# Patient Record
Sex: MALE | Race: BLACK OR AFRICAN AMERICAN | URBAN - METROPOLITAN AREA
[De-identification: names, ages, dates, MRNs, and addresses within clinical notes are randomized per-mention and may not be internally consistent; named-entity substitution may affect disease eponyms.]

---

## 2022-04-27 ENCOUNTER — HISTORICAL (OUTPATIENT)
Dept: ADMINISTRATIVE | Facility: HOSPITAL | Age: 52
End: 2022-04-27
Payer: MEDICARE

## 2022-05-05 ENCOUNTER — HOSPITAL ENCOUNTER (EMERGENCY)
Facility: HOSPITAL | Age: 52
Discharge: HOME | End: 2022-05-06
Attending: STUDENT IN AN ORGANIZED HEALTH CARE EDUCATION/TRAINING PROGRAM
Payer: MEDICARE

## 2022-05-05 DIAGNOSIS — R41.82 ALTERED MENTAL STATUS: ICD-10-CM

## 2022-05-05 DIAGNOSIS — R41.82 ALTERED MENTAL STATUS, UNSPECIFIED ALTERED MENTAL STATUS TYPE: Primary | ICD-10-CM

## 2022-05-05 LAB
ALBUMIN SERPL-MCNC: 2.9 GM/DL (ref 3.5–5)
ALBUMIN/GLOB SERPL: 0.7 RATIO (ref 1.1–2)
ALP SERPL-CCNC: 37 UNIT/L (ref 40–150)
ALT SERPL-CCNC: 7 UNIT/L (ref 0–55)
APPEARANCE UR: CLEAR
AST SERPL-CCNC: 13 UNIT/L (ref 5–34)
BACTERIA #/AREA URNS AUTO: ABNORMAL /HPF
BASOPHILS # BLD AUTO: 0.02 X10(3)/MCL (ref 0–0.2)
BASOPHILS NFR BLD AUTO: 0.2 %
BILIRUB UR QL STRIP.AUTO: NEGATIVE MG/DL
BILIRUBIN DIRECT+TOT PNL SERPL-MCNC: 0.1 MG/DL (ref 0–0.5)
BILIRUBIN DIRECT+TOT PNL SERPL-MCNC: 0.2 MG/DL (ref 0–0.8)
BILIRUBIN DIRECT+TOT PNL SERPL-MCNC: 0.3 MG/DL
BUN SERPL-MCNC: 18.9 MG/DL (ref 8.4–25.7)
CALCIUM SERPL-MCNC: 8.5 MG/DL (ref 8.4–10.2)
CHLORIDE SERPL-SCNC: 111 MMOL/L (ref 98–107)
CO2 SERPL-SCNC: 23 MMOL/L (ref 22–29)
COLOR UR AUTO: YELLOW
CREAT SERPL-MCNC: 1.12 MG/DL (ref 0.73–1.18)
EOSINOPHIL # BLD AUTO: 0.01 X10(3)/MCL (ref 0–0.9)
EOSINOPHIL NFR BLD AUTO: 0.1 %
ERYTHROCYTE [DISTWIDTH] IN BLOOD BY AUTOMATED COUNT: 15.5 % (ref 11.5–17)
GLOBULIN SER-MCNC: 3.9 GM/DL (ref 2.4–3.5)
GLUCOSE SERPL-MCNC: 87 MG/DL (ref 74–100)
GLUCOSE UR QL STRIP.AUTO: NEGATIVE MG/DL
HCT VFR BLD AUTO: 35.9 % (ref 42–52)
HGB BLD-MCNC: 11.9 GM/DL (ref 14–18)
IMM GRANULOCYTES # BLD AUTO: 0.03 X10(3)/MCL (ref 0–0.02)
IMM GRANULOCYTES NFR BLD AUTO: 0.4 % (ref 0–0.43)
KETONES UR QL STRIP.AUTO: ABNORMAL MG/DL
LACTATE SERPL-SCNC: 2.2 MMOL/L (ref 0.5–2.2)
LEUKOCYTE ESTERASE UR QL STRIP.AUTO: NEGATIVE UNIT/L
LYMPHOCYTES # BLD AUTO: 1.04 X10(3)/MCL (ref 0.6–4.6)
LYMPHOCYTES NFR BLD AUTO: 12.5 %
MCH RBC QN AUTO: 29.2 PG (ref 27–31)
MCHC RBC AUTO-ENTMCNC: 33.1 MG/DL (ref 33–36)
MCV RBC AUTO: 88 FL (ref 80–94)
MONOCYTES # BLD AUTO: 0.87 X10(3)/MCL (ref 0.1–1.3)
MONOCYTES NFR BLD AUTO: 10.5 %
NEUTROPHILS # BLD AUTO: 6.3 X10(3)/MCL (ref 2.1–9.2)
NEUTROPHILS NFR BLD AUTO: 76.3 %
NITRITE UR QL STRIP.AUTO: NEGATIVE
NRBC BLD AUTO-RTO: 0 %
PH UR STRIP.AUTO: 6.5 [PH]
PLATELET # BLD AUTO: 288 X10(3)/MCL (ref 130–400)
PMV BLD AUTO: 10.5 FL (ref 9.4–12.4)
POTASSIUM SERPL-SCNC: 4.6 MMOL/L (ref 3.5–5.1)
PROT SERPL-MCNC: 6.8 GM/DL (ref 6.4–8.3)
PROT UR QL STRIP.AUTO: NEGATIVE MG/DL
RBC # BLD AUTO: 4.08 X10(6)/MCL (ref 4.7–6.1)
RBC #/AREA URNS AUTO: ABNORMAL /HPF
RBC UR QL AUTO: NEGATIVE UNIT/L
SODIUM SERPL-SCNC: 142 MMOL/L (ref 136–145)
SP GR UR STRIP.AUTO: 1.02 (ref 1–1.03)
SQUAMOUS #/AREA URNS AUTO: ABNORMAL /LPF
UROBILINOGEN UR STRIP-ACNC: 1 MG/DL
WBC # SPEC AUTO: 8.3 X10(3)/MCL (ref 4.5–11.5)
WBC #/AREA URNS AUTO: ABNORMAL /HPF

## 2022-05-05 PROCEDURE — 93005 ELECTROCARDIOGRAM TRACING: CPT

## 2022-05-05 PROCEDURE — 81003 URINALYSIS AUTO W/O SCOPE: CPT | Performed by: STUDENT IN AN ORGANIZED HEALTH CARE EDUCATION/TRAINING PROGRAM

## 2022-05-05 PROCEDURE — 80053 COMPREHEN METABOLIC PANEL: CPT | Performed by: STUDENT IN AN ORGANIZED HEALTH CARE EDUCATION/TRAINING PROGRAM

## 2022-05-05 PROCEDURE — 93010 ELECTROCARDIOGRAM REPORT: CPT | Mod: ,,, | Performed by: INTERNAL MEDICINE

## 2022-05-05 PROCEDURE — 83605 ASSAY OF LACTIC ACID: CPT | Performed by: STUDENT IN AN ORGANIZED HEALTH CARE EDUCATION/TRAINING PROGRAM

## 2022-05-05 PROCEDURE — 85025 COMPLETE CBC W/AUTO DIFF WBC: CPT | Performed by: STUDENT IN AN ORGANIZED HEALTH CARE EDUCATION/TRAINING PROGRAM

## 2022-05-05 PROCEDURE — 99285 EMERGENCY DEPT VISIT HI MDM: CPT | Mod: 25

## 2022-05-05 PROCEDURE — 36415 COLL VENOUS BLD VENIPUNCTURE: CPT | Performed by: STUDENT IN AN ORGANIZED HEALTH CARE EDUCATION/TRAINING PROGRAM

## 2022-05-05 PROCEDURE — 81015 MICROSCOPIC EXAM OF URINE: CPT | Performed by: STUDENT IN AN ORGANIZED HEALTH CARE EDUCATION/TRAINING PROGRAM

## 2022-05-05 PROCEDURE — 93010 EKG 12-LEAD: ICD-10-PCS | Mod: ,,, | Performed by: INTERNAL MEDICINE

## 2022-05-06 VITALS
OXYGEN SATURATION: 100 % | BODY MASS INDEX: 33.41 KG/M2 | WEIGHT: 220.44 LBS | TEMPERATURE: 99 F | SYSTOLIC BLOOD PRESSURE: 118 MMHG | DIASTOLIC BLOOD PRESSURE: 68 MMHG | RESPIRATION RATE: 19 BRPM | HEIGHT: 68 IN | HEART RATE: 77 BPM

## 2022-05-06 NOTE — ED NOTES
ATTEMPTED TO CALL [phone] [facility], SINCE INFORMATION WAS NEVER SENT. NO ANSWER WILL ATTEMPT TO CALL AGAIN

## 2022-05-06 NOTE — ED NOTES
[facility] to retrieve patient from hospital. Shashi at group home notified to be expecting pt. No questions.

## 2022-05-06 NOTE — ED NOTES
SPOKE WITH CHARGE RN, HE STATED HE WANTED TO KEEP THE PATIENT UNTIL THE AM AND CALL FOR A MEDICAID RIDE TO ENSURE SOMEONE OPENS THE DOOR AT THE GROUP HOME, SINCE THERE WAS NO ANSWER WHEN CALLED. LUNCH BOX PROVIDED COMFORT MEASURES IN PLACE SAFETY MEAUSRES IN PLACE PT IN AGREEMENT

## 2022-05-06 NOTE — ED NOTES
Spoke with Shashi at group [place]; reports he will be there all day and okay for patient to be transported back to facility. No questions. Will arrange transport.

## 2022-05-06 NOTE — ED NOTES
SPOKE WITH DALE FROM [facility], WAS ABLE TO ACQUIRE AN UPDATED LOCATION TO SENT PATIENT BACK TO GROUP [facility], SET UP RIDE WITH [facility], HE STATED IT WOULD TAKE ETA 1 HOUR

## 2022-05-06 NOTE — ED PROVIDER NOTES
Encounter Date: 5/5/2022    SCRIBE #1 NOTE: I, Romel Franklin, am scribing for, and in the presence of,  Dr. Hawkins. I have scribed the following portions of the note - Other sections scribed: HPI, ROS, Physical Exam, MDM, Attending.       History     Chief Complaint   Patient presents with    Altered Mental Status     From group home w/ AMS and hypotension, confusion per aasi but improved w/ 500ml ns en route. Pt c/o generalized pain and weakness, hx cva and d/c 4/29, pt disoriented to time at current     52 y/o AAM with history of CVA presents to ED via EMS from group home after they thought he may have had another stroke.  Pt was found slumped over and diaphoretic next to his bed.  He was discharged following his stroke on 4/29.  EMS reports they do not know if he has any residual deficits.  He was hypotensive when they arrived, but his BP improved to 110/70 after fluids.  His CBG was 169.  Pt complains of weakness and generalized pain.        Neurologic Problem  The primary symptoms include altered mental status. Primary symptoms do not include fever. The symptoms began just prior to arrival. The symptoms are unchanged.   The change in mental status began today. The change in mental status has been unchanged since its onset. The change in mental status includes confusion.   Additional symptoms include weakness and pain. Medical issues also include cerebral vascular accident.     Review of patient's allergies indicates:  No Known Allergies  Past Medical History:   Diagnosis Date    Stroke      History reviewed. No pertinent surgical history.  No family history on file.     Review of Systems   Constitutional: Negative for fever.   HENT: Negative for sore throat.    Eyes: Negative for visual disturbance.   Respiratory: Negative for shortness of breath.    Cardiovascular: Negative for chest pain.   Gastrointestinal: Negative for abdominal pain.   Genitourinary: Negative for dysuria.   Musculoskeletal: Positive for  arthralgias (generalized pain). Negative for joint swelling.   Skin: Negative for rash.   Neurological: Positive for weakness.   Psychiatric/Behavioral: Negative for confusion.   All other systems reviewed and are negative.      Physical Exam     Initial Vitals [05/05/22 1928]   BP Pulse Resp Temp SpO2   131/67 80 16 98.4 °F (36.9 °C) 98 %      MAP       --         Physical Exam    Nursing note and vitals reviewed.  Constitutional: He is not diaphoretic. No distress.   HENT:   Head: Normocephalic and atraumatic.   Eyes: EOM are normal. Pupils are equal, round, and reactive to light.   Neck: Neck supple.   Normal range of motion.  Cardiovascular: Normal rate and regular rhythm.   No murmur heard.  Pulmonary/Chest: Breath sounds normal. No respiratory distress. He has no wheezes. He has no rales.   Abdominal: Abdomen is soft. He exhibits no distension. There is no abdominal tenderness.   Musculoskeletal:         General: Normal range of motion.      Cervical back: Normal range of motion and neck supple.     Neurological: He is alert and oriented to person, place, and time. He has normal strength. No cranial nerve deficit.   Occasional rhythmic movements of tongue c/w history of dyskinesia. Conversant, no focal neuro deficits   Skin: Skin is warm. No rash noted.   Psychiatric: He has a normal mood and affect.         ED Course   Procedures  Labs Reviewed   COMPREHENSIVE METABOLIC PANEL - Abnormal; Notable for the following components:       Result Value    Chloride 111 (*)     Albumin Level 2.9 (*)     Globulin 3.9 (*)     Albumin/Globulin Ratio 0.7 (*)     Alkaline Phosphatase 37 (*)     All other components within normal limits   URINALYSIS, REFLEX TO URINE CULTURE - Abnormal; Notable for the following components:    Ketones, UA Trace (*)     All other components within normal limits   CBC WITH DIFFERENTIAL - Abnormal; Notable for the following components:    RBC 4.08 (*)     Hgb 11.9 (*)     Hct 35.9 (*)     IG#  0.03 (*)     All other components within normal limits   URINALYSIS, MICROSCOPIC - Abnormal; Notable for the following components:    RBC, UA 6-10 (*)     All other components within normal limits   LACTIC ACID, PLASMA - Normal   CBC W/ AUTO DIFFERENTIAL    Narrative:     The following orders were created for panel order CBC auto differential.  Procedure                               Abnormality         Status                     ---------                               -----------         ------                     CBC with Differential[[id number]]        Abnormal            Final result                 Please view results for these tests on the individual orders.     EKG Readings: (Independently Interpreted)   NSR at rate of 71, normal axis, intervals, no ST T segment changes      ECG Results          EKG 12-lead (Final result)  Result time 05/06/22 08:54:39    Final result by Interface, Lab In [facility] (05/06/22 08:54:39)                 Narrative:    Test Reason : R41.82,    Vent. Rate : 071 BPM     Atrial Rate : 071 BPM     P-R Int : 122 ms          QRS Dur : 088 ms      QT Int : 402 ms       P-R-T Axes : 069 038 038 degrees     QTc Int : 436 ms    Normal sinus rhythm  Normal ECG  Confirmed by Davnia Cox MD ([id number]) on 5/6/2022 8:54:31 AM    Referred By: [email or web address]   SELF           Confirmed By:Davina Cox MD                            Imaging Results          CT Head Without Contrast (Final result)  Result time 05/05/22 21:22:12    Final result by Melvin Kapoor MD (05/05/22 21:22:12)                 Impression:      No acute intracranial abnormality identified.      Electronically signed by: Melvin Kapoor  Date:    05/05/2022  Time:    21:22             Narrative:    EXAMINATION:  CT HEAD WITHOUT CONTRAST    CLINICAL HISTORY:  Mental status change, unknown cause;    TECHNIQUE:  Low dose axial images were obtained through the head.  Coronal and sagittal reformations were also performed. Contrast was  not administered.    Automatic exposure control was utilized to reduce the patient's radiation dose.    DLP= 940    COMPARISON:  None.    FINDINGS:  No acute intracranial hemorrhage, edema or mass. No acute parenchymal abnormality.    There is no hydrocephalus, evidence of herniation or midline shift. The ventricles and sulci are normal.    There is normal gray white differentiation.    The osseous structures are normal.    The mastoid air cells are clear.    The auditory canals are patent bilaterally.    The globes and orbital contents are normal bilaterally.    The visualized maxillary, ethmoid and sphenoid sinuses are clear.                              X-Rays:   Independently Interpreted Readings:   Head CT: No hemorrhage.     Medications - No data to display  Medical Decision Making:   History:   I obtained history from: EMS provider.       <> Summary of History: Slumped over at group home - concern for stroke? NIHSS 0 per EMS provider - also reported to me patient has tardive dyskinesia which mainly manifests as abnormal movements of patients tongue. EMS provider also told me patient had a recent stroke but unknown if he has any residual neuro deficits.   Old Medical Records: I decided to obtain old medical records.  Old Records Summarized: other records.       <> Summary of Records: History of psychomotor agitation, schizophrenia, developmental delay   Initial Assessment:   50 yo history as above presenting for possible stroke vs unresponsvie event at group home? On my exam on arrival patient GCS15, alert oriented, without any focal neuro deficits, at baseline as reported to me. Complaints of myalgias but otherwise no complaints. Workup for infection, electrolyte derangement, intracranial abnormality, dehydration, all wnl. Patient comfortable, alert, asking for food on reassessment, plan for transport back to group home.   Clinical Tests:   Lab Tests: Ordered and Reviewed  Radiological Study: Ordered and  Reviewed          Scribe Attestation:   Scribe #1: I performed the above scribed service and the documentation accurately describes the services I performed. I attest to the accuracy of the note.        ED Course as of 05/06/22 1438   Thu May 05, 2022   2321 CT head labs unremarkable.  On reassessment patient is resting comfortably alert and oriented asking for food.  No neuro deficits on reassessment.  Will discharge back to group home at this time [AC]      ED Course User Index  [AC] Timo Hawkins IV, MD             Clinical Impression:   Final diagnoses:  [R41.82] Altered mental status  [R41.82] Altered mental status, unspecified altered mental status type (Primary)          ED Disposition Condition    Discharge Stable        ED Prescriptions     None        Follow-up Information     Follow up With Specialties Details Why Contact Info    [facility] - Emergency Dept Emergency Medicine Go to  If symptoms worsen [address] [facility] [id number] [phone]      ITimo MD personally performed the history, PE, MDM, and procedures as documented above and agree with the scribe's documentation.        Timo Hawkins IV, MD  05/06/22 3321

## 2022-05-19 DIAGNOSIS — I63.9 CEREBROVASCULAR ACCIDENT (CVA), UNSPECIFIED MECHANISM: Primary | ICD-10-CM

## 2022-07-18 ENCOUNTER — DOCUMENTATION ONLY (OUTPATIENT)
Dept: NEUROLOGY | Facility: CLINIC | Age: 52
End: 2022-07-18
Payer: MEDICARE